# Patient Record
Sex: MALE | Race: OTHER | Employment: UNEMPLOYED | URBAN - METROPOLITAN AREA
[De-identification: names, ages, dates, MRNs, and addresses within clinical notes are randomized per-mention and may not be internally consistent; named-entity substitution may affect disease eponyms.]

---

## 2019-09-16 ENCOUNTER — APPOINTMENT (OUTPATIENT)
Dept: CT IMAGING | Age: 44
End: 2019-09-16
Attending: PHYSICIAN ASSISTANT
Payer: OTHER GOVERNMENT

## 2019-09-16 ENCOUNTER — HOSPITAL ENCOUNTER (EMERGENCY)
Age: 44
Discharge: HOME | End: 2019-09-16
Attending: EMERGENCY MEDICINE | Admitting: EMERGENCY MEDICINE
Payer: OTHER GOVERNMENT

## 2019-09-16 VITALS — SYSTOLIC BLOOD PRESSURE: 145 MMHG | HEART RATE: 82 BPM | DIASTOLIC BLOOD PRESSURE: 94 MMHG

## 2019-09-16 DIAGNOSIS — N20.1 URETERAL STONE: Primary | ICD-10-CM

## 2019-09-16 LAB
ALBUMIN SERPL-MCNC: 4.6 G/DL (ref 3.5–5)
ALBUMIN/GLOB SERPL: 1.2 {RATIO} (ref 1.1–2.2)
ALP SERPL-CCNC: 63 U/L (ref 45–117)
ALT SERPL-CCNC: 54 U/L (ref 12–78)
AMORPH CRY URNS QL MICRO: ABNORMAL
ANION GAP SERPL CALC-SCNC: 11 MMOL/L (ref 5–15)
APPEARANCE UR: ABNORMAL
AST SERPL-CCNC: 26 U/L (ref 15–37)
BACTERIA URNS QL MICRO: NEGATIVE /HPF
BASOPHILS # BLD: 0 K/UL (ref 0–0.1)
BASOPHILS NFR BLD: 0 % (ref 0–1)
BILIRUB SERPL-MCNC: 0.6 MG/DL (ref 0.2–1)
BILIRUB UR QL: NEGATIVE
BUN SERPL-MCNC: 19 MG/DL (ref 6–20)
BUN/CREAT SERPL: 17 (ref 12–20)
CALCIUM SERPL-MCNC: 10.1 MG/DL (ref 8.5–10.1)
CHLORIDE SERPL-SCNC: 106 MMOL/L (ref 97–108)
CO2 SERPL-SCNC: 24 MMOL/L (ref 21–32)
COLOR UR: ABNORMAL
COMMENT, HOLDF: NORMAL
CREAT SERPL-MCNC: 1.1 MG/DL (ref 0.7–1.3)
DIFFERENTIAL METHOD BLD: ABNORMAL
EOSINOPHIL # BLD: 0.2 K/UL (ref 0–0.4)
EOSINOPHIL NFR BLD: 2 % (ref 0–7)
EPITH CASTS URNS QL MICRO: ABNORMAL /LPF
ERYTHROCYTE [DISTWIDTH] IN BLOOD BY AUTOMATED COUNT: 12.4 % (ref 11.5–14.5)
GLOBULIN SER CALC-MCNC: 3.9 G/DL (ref 2–4)
GLUCOSE SERPL-MCNC: 120 MG/DL (ref 65–100)
GLUCOSE UR STRIP.AUTO-MCNC: NEGATIVE MG/DL
HCT VFR BLD AUTO: 46.2 % (ref 36.6–50.3)
HGB BLD-MCNC: 16.2 G/DL (ref 12.1–17)
HGB UR QL STRIP: ABNORMAL
IMM GRANULOCYTES # BLD AUTO: 0 K/UL
IMM GRANULOCYTES NFR BLD AUTO: 0 %
KETONES UR QL STRIP.AUTO: NEGATIVE MG/DL
LEUKOCYTE ESTERASE UR QL STRIP.AUTO: NEGATIVE
LIPASE SERPL-CCNC: 1053 U/L (ref 73–393)
LYMPHOCYTES # BLD: 5.3 K/UL (ref 0.8–3.5)
LYMPHOCYTES NFR BLD: 50 % (ref 12–49)
MCH RBC QN AUTO: 32 PG (ref 26–34)
MCHC RBC AUTO-ENTMCNC: 35.1 G/DL (ref 30–36.5)
MCV RBC AUTO: 91.1 FL (ref 80–99)
MONOCYTES # BLD: 0.3 K/UL (ref 0–1)
MONOCYTES NFR BLD: 3 % (ref 5–13)
NEUTS SEG # BLD: 4.7 K/UL (ref 1.8–8)
NEUTS SEG NFR BLD: 45 % (ref 32–75)
NITRITE UR QL STRIP.AUTO: NEGATIVE
NRBC # BLD: 0 K/UL (ref 0–0.01)
NRBC BLD-RTO: 0 PER 100 WBC
PH UR STRIP: 7 [PH] (ref 5–8)
PLATELET # BLD AUTO: 201 K/UL (ref 150–400)
PMV BLD AUTO: 11.3 FL (ref 8.9–12.9)
POTASSIUM SERPL-SCNC: 3.3 MMOL/L (ref 3.5–5.1)
PROT SERPL-MCNC: 8.5 G/DL (ref 6.4–8.2)
PROT UR STRIP-MCNC: 30 MG/DL
RBC # BLD AUTO: 5.07 M/UL (ref 4.1–5.7)
RBC #/AREA URNS HPF: ABNORMAL /HPF (ref 0–5)
RBC MORPH BLD: ABNORMAL
SAMPLES BEING HELD,HOLD: NORMAL
SODIUM SERPL-SCNC: 141 MMOL/L (ref 136–145)
SP GR UR REFRACTOMETRY: 1.02 (ref 1–1.03)
UR CULT HOLD, URHOLD: NORMAL
UROBILINOGEN UR QL STRIP.AUTO: 1 EU/DL (ref 0.2–1)
WBC # BLD AUTO: 10.5 K/UL (ref 4.1–11.1)
WBC MORPH BLD: ABNORMAL
WBC URNS QL MICRO: ABNORMAL /HPF (ref 0–4)

## 2019-09-16 PROCEDURE — 96361 HYDRATE IV INFUSION ADD-ON: CPT

## 2019-09-16 PROCEDURE — 96375 TX/PRO/DX INJ NEW DRUG ADDON: CPT

## 2019-09-16 PROCEDURE — 74011250636 HC RX REV CODE- 250/636: Performed by: PHYSICIAN ASSISTANT

## 2019-09-16 PROCEDURE — 80053 COMPREHEN METABOLIC PANEL: CPT

## 2019-09-16 PROCEDURE — 36415 COLL VENOUS BLD VENIPUNCTURE: CPT

## 2019-09-16 PROCEDURE — 99283 EMERGENCY DEPT VISIT LOW MDM: CPT

## 2019-09-16 PROCEDURE — 74176 CT ABD & PELVIS W/O CONTRAST: CPT

## 2019-09-16 PROCEDURE — 96374 THER/PROPH/DIAG INJ IV PUSH: CPT

## 2019-09-16 PROCEDURE — 83690 ASSAY OF LIPASE: CPT

## 2019-09-16 PROCEDURE — 81001 URINALYSIS AUTO W/SCOPE: CPT

## 2019-09-16 PROCEDURE — 85025 COMPLETE CBC W/AUTO DIFF WBC: CPT

## 2019-09-16 RX ORDER — PROCHLORPERAZINE EDISYLATE 5 MG/ML
10 INJECTION INTRAMUSCULAR; INTRAVENOUS
Status: COMPLETED | OUTPATIENT
Start: 2019-09-16 | End: 2019-09-16

## 2019-09-16 RX ORDER — DIPHENHYDRAMINE HYDROCHLORIDE 50 MG/ML
25 INJECTION, SOLUTION INTRAMUSCULAR; INTRAVENOUS
Status: COMPLETED | OUTPATIENT
Start: 2019-09-16 | End: 2019-09-16

## 2019-09-16 RX ORDER — ONDANSETRON 4 MG/1
4 TABLET, ORALLY DISINTEGRATING ORAL
Qty: 10 TAB | Refills: 0 | Status: ON HOLD | OUTPATIENT
Start: 2019-09-16 | End: 2022-09-26

## 2019-09-16 RX ORDER — NAPROXEN 500 MG/1
500 TABLET ORAL 2 TIMES DAILY WITH MEALS
Qty: 20 TAB | Refills: 0 | Status: SHIPPED | OUTPATIENT
Start: 2019-09-16 | End: 2019-09-26

## 2019-09-16 RX ORDER — ONDANSETRON 2 MG/ML
4 INJECTION INTRAMUSCULAR; INTRAVENOUS
Status: COMPLETED | OUTPATIENT
Start: 2019-09-16 | End: 2019-09-16

## 2019-09-16 RX ORDER — KETOROLAC TROMETHAMINE 30 MG/ML
15 INJECTION, SOLUTION INTRAMUSCULAR; INTRAVENOUS
Status: COMPLETED | OUTPATIENT
Start: 2019-09-16 | End: 2019-09-16

## 2019-09-16 RX ADMIN — ONDANSETRON 4 MG: 2 INJECTION INTRAMUSCULAR; INTRAVENOUS at 14:49

## 2019-09-16 RX ADMIN — DIPHENHYDRAMINE HYDROCHLORIDE 25 MG: 50 INJECTION, SOLUTION INTRAMUSCULAR; INTRAVENOUS at 15:11

## 2019-09-16 RX ADMIN — SODIUM CHLORIDE 1000 ML: 900 INJECTION, SOLUTION INTRAVENOUS at 14:49

## 2019-09-16 RX ADMIN — PROCHLORPERAZINE EDISYLATE 10 MG: 5 INJECTION INTRAMUSCULAR; INTRAVENOUS at 15:11

## 2019-09-16 RX ADMIN — KETOROLAC TROMETHAMINE 15 MG: 30 INJECTION, SOLUTION INTRAMUSCULAR at 14:49

## 2019-09-16 NOTE — ED TRIAGE NOTES
Patient arrives from home with severe right sided flank pains. Started around 4am.    History of kidney stones.   Patient is diaphoretic, in distress,

## 2019-09-16 NOTE — ED PROVIDER NOTES
72-year-old male presenting via EMS with complaint of sudden onset of right foot complaints is about 430 this morning gradually worsening with associated nausea without vomiting. He reports associated diaphoresis and chills no associated fever, cough, chest pain, shortness of breath, constipation, diarrhea. He also reports noticing blood within urine. Pain is described as worse than prior kidney stones. He reports a family history of renal failure. No medications for pain and nausea prior to arrival.           Past Medical History:   Diagnosis Date    Depression     High cholesterol     Hypertension     Kidney stones     Peptic ulcer        Past Surgical History:   Procedure Laterality Date    HX CERVICAL FUSION      C5 C4     HX LAP CHOLECYSTECTOMY  2004    HX OTHER SURGICAL  2016    Incision and drainage of intersphincteric perirectal abscess; Dr. Lou Torres.          Family History:   Problem Relation Age of Onset    Diabetes Maternal Grandmother     Stroke Maternal Grandmother     Colon Cancer Paternal Grandmother     Cancer Paternal Grandfather         prostate    Heart Disease Father     Anesth Problems Neg Hx        Social History     Socioeconomic History    Marital status:      Spouse name: Not on file    Number of children: Not on file    Years of education: Not on file    Highest education level: Not on file   Occupational History    Not on file   Social Needs    Financial resource strain: Not on file    Food insecurity:     Worry: Not on file     Inability: Not on file    Transportation needs:     Medical: Not on file     Non-medical: Not on file   Tobacco Use    Smoking status: Former Smoker     Packs/day: 0.50     Years: 10.00     Pack years: 5.00     Last attempt to quit: 3/16/2011     Years since quittin.5    Smokeless tobacco: Never Used    Tobacco comment: former cigarette smoker quit 2012   Substance and Sexual Activity    Alcohol use: No    Drug use: No    Sexual activity: Not on file   Lifestyle    Physical activity:     Days per week: Not on file     Minutes per session: Not on file    Stress: Not on file   Relationships    Social connections:     Talks on phone: Not on file     Gets together: Not on file     Attends [employer] service: Not on file     Active member of club or organization: Not on file     Attends meetings of clubs or organizations: Not on file     Relationship status: Not on file    Intimate partner violence:     Fear of current or ex partner: Not on file     Emotionally abused: Not on file     Physically abused: Not on file     Forced sexual activity: Not on file   Other Topics Concern    Not on file   Social History Narrative    Not on file         ALLERGIES: Morphine    Review of Systems   Constitutional: Positive for appetite change, chills and diaphoresis. Negative for fever. HENT: Negative for congestion, ear pain, rhinorrhea, sore throat and voice change. Eyes: Negative. Negative for photophobia, pain and itching. Respiratory: Negative for cough, chest tightness and shortness of breath. Cardiovascular: Negative for chest pain and palpitations. Gastrointestinal: Positive for nausea. Negative for abdominal distention, abdominal pain, constipation, diarrhea and vomiting. Genitourinary: Positive for flank pain. Negative for difficulty urinating, dysuria, frequency and urgency. Skin: Positive for color change. Neurological: Negative for weakness, numbness and headaches. Psychiatric/Behavioral: Negative for confusion and decreased concentration. All other systems reviewed and are negative. Vitals:    09/16/19 1440   BP: (!) 145/94   Pulse: 82            Physical Exam   Constitutional: He is oriented to person, place, and time. He appears well-developed and well-nourished. He appears distressed. Uncomfortable appearing adult male diaphoretic   HENT:   Head: Normocephalic and atraumatic. Right Ear: External ear normal.   Left Ear: External ear normal.   Nose: Nose normal.   Mouth/Throat: Oropharynx is clear and moist. No oropharyngeal exudate. Eyes: Pupils are equal, round, and reactive to light. Conjunctivae and EOM are normal. Right eye exhibits no discharge. Left eye exhibits no discharge. Neck: Normal range of motion. Neck supple. Cardiovascular: Normal rate, regular rhythm and normal heart sounds. Pulmonary/Chest: Effort normal and breath sounds normal. He has no wheezes. He has no rales. Abdominal: Soft. Bowel sounds are normal. He exhibits no distension. There is no tenderness. There is no guarding. Musculoskeletal: Normal range of motion. Lymphadenopathy:     He has no cervical adenopathy. Neurological: He is alert and oriented to person, place, and time. No cranial nerve deficit. Skin: Skin is warm. He is diaphoretic. Psychiatric: He has a normal mood and affect. His behavior is normal.   Nursing note and vitals reviewed. MDM  Number of Diagnoses or Management Options  Diagnosis management comments: 56-year-old male presenting with right-sided flank pain since about 4 AM.  History of prior obstructive uropathy. Likely same etiology as his current presentation. Will check basic labs, CT abdomen and pelvis, provide analgesia, IV fluid and antiemetic therapy. Amount and/or Complexity of Data Reviewed  Clinical lab tests: ordered and reviewed  Tests in the radiology section of CPT®: ordered and reviewed  Independent visualization of images, tracings, or specimens: yes           Procedures      Progress note  Labs and imaging pending. Patient care transferred to [place], [place]      I was personally available for consultation in the emergency department. I have reviewed the chart and agree with the documentation recorded by the [facility] AND [facility], including the assessment, treatment plan, and disposition.   Dea Puga MD

## 2019-09-16 NOTE — DISCHARGE INSTRUCTIONS
Patient Education        Kidney Stone: Care Instructions  Your Care Instructions    Kidney stones are formed when salts, minerals, and other substances normally found in the urine clump together. They can be as small as grains of sand or, rarely, as large as golf balls. While the stone is traveling through the ureter, which is the tube that carries urine from the kidney to the bladder, you will probably feel pain. The pain may be mild or very severe. You may also have some blood in your urine. As soon as the stone reaches the bladder, any intense pain should go away. If a stone is too large to pass on its own, you may need a medical procedure to help you pass the stone. The doctor has checked you carefully, but problems can develop later. If you notice any problems or new symptoms, get medical treatment right away. Follow-up care is a key part of your treatment and safety. Be sure to make and go to all appointments, and call your doctor if you are having problems. It's also a good idea to know your test results and keep a list of the medicines you take. How can you care for yourself at home? · Drink plenty of fluids, enough so that your urine is light yellow or clear like water. If you have kidney, heart, or liver disease and have to limit fluids, talk with your doctor before you increase the amount of fluids you drink. · Take pain medicines exactly as directed. Call your doctor if you think you are having a problem with your medicine. ? If the doctor gave you a prescription medicine for pain, take it as prescribed. ? If you are not taking a prescription pain medicine, ask your doctor if you can take an over-the-counter medicine. Read and follow all instructions on the label. · Your doctor may ask you to strain your urine so that you can collect your kidney stone when it passes. You can use a kitchen strainer or a tea strainer to catch the stone.  Store it in a plastic bag until you see your doctor again.  Preventing future kidney stones  Some changes in your diet may help prevent kidney stones. Depending on the cause of your stones, your doctor may recommend that you:  · Drink plenty of fluids, enough so that your urine is light yellow or clear like water. If you have kidney, heart, or liver disease and have to limit fluids, talk with your doctor before you increase the amount of fluids you drink. · Limit coffee, tea, and alcohol. Also avoid grapefruit juice. · Do not take more than the recommended daily dose of vitamins C and D.  · Avoid antacids such as Gaviscon, Maalox, Mylanta, or Tums. · Limit the amount of salt (sodium) in your diet. · Eat a balanced diet that is not too high in protein. · Limit foods that are high in a substance called oxalate, which can cause kidney stones. These foods include dark green vegetables, rhubarb, chocolate, wheat bran, nuts, cranberries, and beans. When should you call for help? Call your doctor now or seek immediate medical care if:    · You cannot keep down fluids.     · Your pain gets worse.     · You have a fever or chills.     · You have new or worse pain in your back just below your rib cage (the flank area).     · You have new or more blood in your urine.    Watch closely for changes in your health, and be sure to contact your doctor if:    · You do not get better as expected. Where can you learn more? Go to http://caroline-daria.info/. Enter K593 in the search box to learn more about \"Kidney Stone: Care Instructions. \"  Current as of: October 31, 2018  Content Version: 12.1  © 7738-4205 [employer]. Care instructions adapted under license by [employer] (which disclaims liability or warranty for this information).  If you have questions about a medical condition or this instruction, always ask your healthcare professional. [address] any warranty or liability for your use of this information. We hope that we have addressed all of your medical concerns. The examination and treatment you received in the Emergency Department were for an emergent problem and were not intended as complete care. It is important that you follow up with your healthcare provider(s) for ongoing care. If your symptoms worsen or do not improve as expected, and you are unable to reach your usual health care provider(s), you should return to the Emergency Department. Today's healthcare is undergoing tremendous change, and patient satisfaction surveys are one of the many tools to assess the quality of medical care. You may receive a survey from the [employer] regarding your experience in the Emergency Department. I hope that your experience has been completely positive, particularly the medical care that I provided. As such, please participate in the survey; anything less than excellent does not meet my expectations or intentions. [facility] and [address] [place] [address] participate in nationally recognized quality of care measures. If your blood pressure is greater than 120/80, as reported below, we urge that you seek medical care to address the potential of high blood pressure, commonly known as hypertension. Hypertension can be hereditary or can be caused by certain medical conditions, pain, stress, or \"white coat syndrome. \"       Please make an appointment with your health care provider(s) for follow up of your Emergency Department visit. VITALS:   Patient Vitals for the past 8 hrs:   Pulse BP   09/16/19 1440 82 (!) 145/94          Thank you for allowing us to provide you with medical care today. We realize that you have many choices for your emergency care needs. Please choose us in the future for any continued health care needs. Edwige Arias, [address] [place] [address] [address]   Office: [phone]            Recent Results (from the past 24 hour(s))   CBC WITH AUTOMATED DIFF    Collection Time: 09/16/19  2:46 PM   Result Value Ref Range    WBC 10.5 4.1 - 11.1 K/uL    RBC 5.07 4. 10 - 5.70 M/uL    HGB 16.2 12.1 - 17.0 g/dL    HCT 46.2 36.6 - 50.3 %    MCV 91.1 80.0 - 99.0 FL    MCH 32.0 26.0 - 34.0 PG    MCHC 35.1 30.0 - 36.5 g/dL    RDW 12.4 11.5 - 14.5 %    PLATELET 951 423 - 763 K/uL    MPV 11.3 8.9 - 12.9 FL    NRBC 0.0 0  WBC    ABSOLUTE NRBC 0.00 0.00 - 0.01 K/uL    NEUTROPHILS 45 32 - 75 %    LYMPHOCYTES 50 (H) 12 - 49 %    MONOCYTES 3 (L) 5 - 13 %    EOSINOPHILS 2 0 - 7 %    BASOPHILS 0 0 - 1 %    IMMATURE GRANULOCYTES 0 %    ABS. NEUTROPHILS 4.7 1.8 - 8.0 K/UL    ABS. LYMPHOCYTES 5.3 (H) 0.8 - 3.5 K/UL    ABS. MONOCYTES 0.3 0.0 - 1.0 K/UL    ABS. EOSINOPHILS 0.2 0.0 - 0.4 K/UL    ABS. BASOPHILS 0.0 0.0 - 0.1 K/UL    ABS. IMM. GRANS. 0.0 K/UL    DF MANUAL      RBC COMMENTS NORMOCYTIC, NORMOCHROMIC      WBC COMMENTS ATYPICAL LYMPHOCYTES PRESENT     METABOLIC PANEL, COMPREHENSIVE    Collection Time: 09/16/19  2:46 PM   Result Value Ref Range    Sodium 141 136 - 145 mmol/L    Potassium 3.3 (L) 3.5 - 5.1 mmol/L    Chloride 106 97 - 108 mmol/L    CO2 24 21 - 32 mmol/L    Anion gap 11 5 - 15 mmol/L    Glucose 120 (H) 65 - 100 mg/dL    BUN 19 6 - 20 MG/DL    Creatinine 1.10 0.70 - 1.30 MG/DL    BUN/Creatinine ratio 17 12 - 20      GFR est AA >60 >60 ml/min/1.73m2    GFR est non-AA >60 >60 ml/min/1.73m2    Calcium 10.1 8.5 - 10.1 MG/DL    Bilirubin, total 0.6 0.2 - 1.0 MG/DL    ALT (SGPT) 54 12 - 78 U/L    AST (SGOT) 26 15 - 37 U/L    Alk.  phosphatase 63 45 - 117 U/L    Protein, total 8.5 (H) 6.4 - 8.2 g/dL    Albumin 4.6 3.5 - 5.0 g/dL    Globulin 3.9 2.0 - 4.0 g/dL    A-G Ratio 1.2 1.1 - 2.2     SAMPLES BEING HELD    Collection Time: 09/16/19  2:46 PM   Result Value Ref Range    SAMPLES BEING HELD 1RED 1BLU     COMMENT        Add-on orders for these samples will be processed based on acceptable specimen integrity and analyte stability, which may vary by analyte. LIPASE    Collection Time: 09/16/19  2:46 PM   Result Value Ref Range    Lipase 1,053 (H) 73 - 393 U/L   URINALYSIS W/MICROSCOPIC    Collection Time: 09/16/19  3:11 PM   Result Value Ref Range    Color YELLOW/STRAW      Appearance TURBID (A) CLEAR      Specific gravity 1.020 1.003 - 1.030      pH (UA) 7.0 5.0 - 8.0      Protein 30 (A) NEG mg/dL    Glucose NEGATIVE  NEG mg/dL    Ketone NEGATIVE  NEG mg/dL    Bilirubin NEGATIVE  NEG      Blood MODERATE (A) NEG      Urobilinogen 1.0 0.2 - 1.0 EU/dL    Nitrites NEGATIVE  NEG      Leukocyte Esterase NEGATIVE  NEG      WBC 0-4 0 - 4 /hpf    RBC 5-10 0 - 5 /hpf    Epithelial cells FEW FEW /lpf    Bacteria NEGATIVE  NEG /hpf    Amorphous Crystals 2+ (A) NEG   URINE CULTURE HOLD SAMPLE    Collection Time: 09/16/19  3:11 PM   Result Value Ref Range    Urine culture hold        URINE ON HOLD IN MICROBIOLOGY DEPT FOR 3 DAYS. IF UNPRESERVED URINE IS SUBMITTED, IT CANNOT BE USED FOR ADDITIONAL TESTING AFTER 24 HRS, RECOLLECTION WILL BE REQUIRED. Ct Abd Pelv Wo Cont    Result Date: 9/16/2019  EXAM: CT ABDOMEN AND PELVIS WITHOUT CONTRAST INDICATION: Flank pain, nausea and vomiting. COMPARISON: 6/13/2013. CONTRAST: None. TECHNIQUE: Unenhanced multislice helical CT was performed from the diaphragm to the symphysis pubis without intravenous contrast administration. Oral contrast was not administered. Contiguous 5 mm axial images were reconstructed and lung and soft tissue windows were generated. Coronal and sagittal reformations were generated. CT dose reduction was achieved through use of a standardized protocol tailored for this examination and automatic exposure control for dose modulation. Adaptive statistical iterative reconstruction (ASIR) was utilized for this examination. FINDINGS: LOWER CHEST: The visualized portions of the lung bases are clear.  The absence of intravenous contrast material reduces the sensitivity for evaluation of the solid parenchymal organs of the abdomen. ABDOMEN: Liver: The liver is normal in size and contour with no focal abnormality. Gallbladder and bile ducts: There are clips in the gallbladder fossa and the gallbladder is absent. Spleen: No abnormality. Pancreas: No abnormality. Adrenal glands: No abnormality. Kidneys: No focal parenchymal abnormality. There is hydronephrosis and hydroureter on the right down to the level of a 3 mm stone at the right ureterovesical junction. There are no renal calculi. PELVIS: Reproductive organs: The prostate gland and seminal vesicles are symmetrical. There are a few prosthetic calcifications and pelvic phleboliths. Bladder: No abnormality. RETROPERITONEUM: The aorta tapers without aneurysm. There is no retroperitoneal adenopathy or mass. There is no pelvic mass or adenopathy. BOWEL AND MESENTERY: The small bowel is normal. The appendix is normal. PERITONEUM: There is no ascites or free intraperitoneal air. BONES AND SOFT TISSUES: The bones and soft tissues of the abdominal wall are within normal limits. IMPRESSION: 1. 3 mm right ureterovesical junction calculus with obstruction. 2. Status post cholecystectomy.

## 2020-09-11 ENCOUNTER — OFFICE VISIT (OUTPATIENT)
Dept: NEUROLOGY | Facility: CLINIC | Age: 45
End: 2020-09-11
Payer: OTHER GOVERNMENT

## 2020-09-11 VITALS
HEIGHT: 69 IN | SYSTOLIC BLOOD PRESSURE: 140 MMHG | RESPIRATION RATE: 16 BRPM | WEIGHT: 220 LBS | BODY MASS INDEX: 32.58 KG/M2 | OXYGEN SATURATION: 100 % | DIASTOLIC BLOOD PRESSURE: 90 MMHG | HEART RATE: 99 BPM

## 2020-09-11 DIAGNOSIS — M54.12 CERVICAL RADICULOPATHY: ICD-10-CM

## 2020-09-11 DIAGNOSIS — S06.9X0A TRAUMATIC BRAIN INJURY, WITHOUT LOSS OF CONSCIOUSNESS, INITIAL ENCOUNTER (HCC): Primary | ICD-10-CM

## 2020-09-11 PROCEDURE — 99204 OFFICE O/P NEW MOD 45 MIN: CPT | Performed by: PSYCHIATRY & NEUROLOGY
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
## 2020-09-11 NOTE — PROGRESS NOTES
patient states he is here for anger issues since 2004 he had cervical fusion. he has been seeing therapists and phych even treated with medication still nothing helps. no other concerns today    . Chief Complaint   Patient presents with    Follow-up     patient states he is here for anger issues since 2004 he had cervical fusion. he has been seeing therapists and phych even treated with medication still nothing helps. no other concerns today     .   Visit Vitals  /90 (BP 1 Location: Left arm, BP Patient Position: Sitting)   Pulse 99   Resp 16   Ht 5' 9\" (1.753 m)   Wt 220 lb (99.8 kg)   SpO2 100%   BMI 32.49 kg/m²

## 2020-09-15 ENCOUNTER — OFFICE VISIT (OUTPATIENT)
Dept: NEUROLOGY | Facility: CLINIC | Age: 45
End: 2020-09-15
Payer: OTHER GOVERNMENT

## 2020-09-15 VITALS
HEART RATE: 72 BPM | HEIGHT: 69 IN | OXYGEN SATURATION: 98 % | SYSTOLIC BLOOD PRESSURE: 135 MMHG | WEIGHT: 220 LBS | DIASTOLIC BLOOD PRESSURE: 80 MMHG | BODY MASS INDEX: 32.58 KG/M2 | RESPIRATION RATE: 16 BRPM

## 2020-09-15 DIAGNOSIS — M54.12 CERVICAL RADICULOPATHY AT C6: Primary | ICD-10-CM

## 2020-09-15 PROCEDURE — 95886 MUSC TEST DONE W/N TEST COMP: CPT | Performed by: PSYCHIATRY & NEUROLOGY

## 2020-09-15 PROCEDURE — 95909 NRV CNDJ TST 5-6 STUDIES: CPT | Performed by: PSYCHIATRY & NEUROLOGY

## 2020-09-15 NOTE — PROGRESS NOTES
[address] [facility]  [address]  Phone [phone] Fax [phone]  Test Date:  9/15/2020    Patient: Codie Palomo : 1975 Physician: Sneha Watt MD   Sex: Male Height: 5' 9\" Ref Phys: Sneha Watt. MD   ID#: [id number] Weight: 220 lbs. Technician: Mouna Jackson. .EMG TECH     Patient Complaints:    Chronic radicular pain in setting of prior C4-C6 fusion for C5 burst fracture     Patient History / Exam:    Neris Marrufo 46-year-old right-hand-dominant male with history of prior C5 burst fracture C4-C6 fusion who presents for evaluation of ongoing radicular symptoms. Examination is stable from recent visit for initial evaluation at [facility] Neurology clinic    NCV & EMG Findings:  Sensory nerve conduction studies (NCSs) of the right median, ulnar and radial nerves demonstrated normal peak latencies and amplitudes. Sensory NCS of the left median nerve demonstrated normal peak latency and amplitude. Motor NCSs of the right median and ulnar nerve demonstrated normal onset latencies, amplitudes and conduction velocities. Motor NCS of the left median nerve demonstrated normal onset latency, amplitude and conduction velocity. Right ulnar F-wave latency was normal.    Median to ulnar sensory transpulmonary comparison NCS demonstrated borderline to mild latency prolongation bilaterally. Electromyography (EMG) of the right FDI and ABP were unremarkable. EMG of right biceps, triceps and deltoid demonstrated chronic reinnervation changes in the form of large amplitude, predominantly polyphasic motor units prolonged in duration with reduced recruitment. EMG of the mid cervical paraspinal did not demonstrate ongoing denervation      Impression: This is an abnormal nerve conduction/EMG consistent with a chronic C5-C7 (predominantly C6) right cervical polyradiculopathy. Findings on sensory comparison studies are considered equivocal and not clearly significant given symptoms, lack of additional median sensorimotor abnormalities.     ___________________________  Elsi Sibley MD        Nerve Conduction Studies  Anti Sensory Summary Table     Stim Site NR Peak (ms) Norm Peak (ms) P-T Amp (µV) Norm P-T Amp Onset (ms) Site1 Site2 Delta-P (ms) Dist (cm) Yuan (m/s) Norm Yuan (m/s)   Left Median Anti Sensory (2nd Digit)  32.9°C   Wrist    3.1 <3.6 14.0 >10 2.3 Wrist 2nd Digit 3.1 14.0 45 >39   Right Median Anti Sensory (2nd Digit)  31.7°C   Wrist    3.4 <3.6 34.4 >10 2.8 Wrist 2nd Digit 3.4 14.0 41 >39   Right Radial Anti Sensory (Base 1st Digit)  32.4°C   Wrist    2.3 <3.1 29.6  1.7 Wrist Base 1st Digit 2.3 10.0 43    Right Ulnar Anti Sensory (5th Digit)  32°C   Wrist    3.4 <3.7 38.4 >15.0 2.2 Wrist 5th Digit 3.4 14.0 41 >38     Motor Summary Table     Stim Site NR Onset (ms) Norm Onset (ms) O-P Amp (mV) Norm O-P Amp Site1 Site2 Delta-0 (ms) Dist (cm) Yuan (m/s) Norm Yuan (m/s)   Left Median Motor (Abd Poll Brev)  32.9°C   Wrist    3.9 <4.2 5.7 >5 Elbow Wrist 4.0 20.0 50 >50   Elbow    7.9  5.1          Right Median Motor (Abd Poll Brev)  31.2°C   Wrist    4.2 <4.2 5.3 >5 Elbow Wrist 3.8 21.0 55 >50   Elbow    8.0  4.6          Right Ulnar Motor (Abd Dig Minimi)  33.1°C   Wrist    3.0 <4.2 10.5 >3 B Elbow Wrist 3.7 20.0 54 >53   B Elbow    6.7  9.0  A Elbow B Elbow 1.9 10.0 53 >53   A Elbow    8.6  8.8            Comparison Summary Table     Stim Site NR Peak (ms) Norm Peak (ms) P-T Amp (µV) Site1 Site2 Delta-P (ms) Norm Delta (ms)   Left Median/Ulnar Palm Comparison (Wrist - 8cm)  33°C   Median Palm    2.0 <2.5 49.3 Median Palm Ulnar Palm 0.5 <0.3   Ulnar Palm    1.5 <2.5 18.7       Right Median/Ulnar Palm Comparison (Wrist - 8cm)  32.2°C   Median Palm    1.9 <2.5 13.2 Median Palm Ulnar Palm 0.6 <0.3   Ulnar Palm    1.3 <2.5 13.5         F Wave Studies     NR F-Lat (ms) Lat Norm (ms) L-R F-Lat (ms) L-R Lat Norm   Right Ulnar (Mrkrs) (Abd Dig Min)  33.3°C      30.11 <36  <2.5     EMG     Side Muscle Nerve Root Ins Act Fibs Psw Amp Dur Poly Recrt Int Juani Ralphs Comment   Right Abd Poll Brev Median C8-T1 Nml Nml Nml Nml Nml 0 Nml Nml    Right 1stDorInt Ulnar C8-T1 Nml Nml Nml Nml Nml 0 Nml Nml    Right Biceps Musculocut C5-6 Nml Nml Nml Incr >12ms 1+ Reduced Nml    Right Triceps Radial C6-7-8 Nml Nml Nml Incr >12ms 1+ Reduced Nml    Right Deltoid Axillary C5-6 Nml Nml Nml Incr > 12ms 0 Reduced  Nml    Right Cervical Parasp Mid Rami C4-6 Nml Nml Nml               Waveforms:

## 2020-09-24 ENCOUNTER — HOSPITAL ENCOUNTER (OUTPATIENT)
Dept: MRI IMAGING | Age: 45
Discharge: HOME | End: 2020-09-24
Attending: PSYCHIATRY & NEUROLOGY
Payer: OTHER GOVERNMENT

## 2020-09-24 DIAGNOSIS — M54.12 CERVICAL RADICULOPATHY: ICD-10-CM

## 2020-09-24 DIAGNOSIS — S06.9X0A TRAUMATIC BRAIN INJURY, WITHOUT LOSS OF CONSCIOUSNESS, INITIAL ENCOUNTER (HCC): ICD-10-CM

## 2020-09-24 PROCEDURE — 72141 MRI NECK SPINE W/O DYE: CPT

## 2020-09-24 PROCEDURE — 70551 MRI BRAIN STEM W/O DYE: CPT

## 2020-09-25 ENCOUNTER — TELEPHONE (OUTPATIENT)
Dept: NEUROLOGY | Facility: CLINIC | Age: 45
End: 2020-09-25

## 2020-10-15 ENCOUNTER — OFFICE VISIT (OUTPATIENT)
Dept: NEUROLOGY | Facility: CLINIC | Age: 45
End: 2020-10-15
Payer: OTHER GOVERNMENT

## 2020-10-15 VITALS
OXYGEN SATURATION: 94 % | HEART RATE: 84 BPM | BODY MASS INDEX: 32.58 KG/M2 | SYSTOLIC BLOOD PRESSURE: 102 MMHG | DIASTOLIC BLOOD PRESSURE: 60 MMHG | HEIGHT: 69 IN | RESPIRATION RATE: 16 BRPM | WEIGHT: 220 LBS

## 2020-10-15 DIAGNOSIS — M54.12 CERVICAL RADICULOPATHY AT C6: Primary | ICD-10-CM

## 2020-10-15 PROCEDURE — 99213 OFFICE O/P EST LOW 20 MIN: CPT | Performed by: PSYCHIATRY & NEUROLOGY

## 2020-10-15 NOTE — PATIENT INSTRUCTIONS
PRESCRIPTION REFILL POLICY [facility] Statement to Patients April 1, 2014 In an effort to ensure the large volume of patient prescription refills is processed in the most efficient and expeditious manner, we are asking our patients to assist us by calling your Pharmacy for all prescription refills, this will include also your  Mail Order Pharmacy. The pharmacy will contact our office electronically to continue the refill process. Please do not wait until the last minute to call your pharmacy. We need at least 48 hours (2days) to fill prescriptions. We also encourage you to call your pharmacy before going to  your prescription to make sure it is ready. With regard to controlled substance prescription refill requests (narcotic refills) that need to be picked up at our office, we ask your cooperation by providing us with at least 72 hours (3days) notice that you will need a refill. We will not refill narcotic prescription refill requests after 4:00pm on any weekday, Monday through Thursday, or after 2:00pm on Fridays, or on the weekends. We encourage everyone to explore another way of getting your prescription refill request processed using [employer], our patient web portal through our electronic medical record system. [employer] is an efficient and effective way to communicate your medication request directly to the office and  downloadable as an adam on your smart phone . [employer] also features a review functionality that allows you to view your medication list as well as leave messages for your physician. Are you ready to get connected? If so please review the attatched instructions or speak to any of our staff to get you set up right away! Thank you so much for your cooperation. Should you have any questions please contact our Practice Administrator. The Physicians and Staff,  [facility]

## 2020-10-15 NOTE — PROGRESS NOTES
. Elizabeth Zapata presents today to follow up TBI. He denies any current neurological complaints. Depression screening done on this patient.

## 2021-06-03 ENCOUNTER — TRANSCRIBE ORDER (OUTPATIENT)
Dept: SCHEDULING | Age: 46
End: 2021-06-03

## 2021-06-03 DIAGNOSIS — M54.50 LOW BACK PAIN: Primary | ICD-10-CM

## 2021-08-20 ENCOUNTER — HOSPITAL ENCOUNTER (OUTPATIENT)
Dept: INTERVENTIONAL RADIOLOGY/VASCULAR | Age: 46
Discharge: HOME | End: 2021-08-20
Attending: STUDENT IN AN ORGANIZED HEALTH CARE EDUCATION/TRAINING PROGRAM | Admitting: STUDENT IN AN ORGANIZED HEALTH CARE EDUCATION/TRAINING PROGRAM
Payer: OTHER GOVERNMENT

## 2021-08-20 VITALS
SYSTOLIC BLOOD PRESSURE: 138 MMHG | RESPIRATION RATE: 15 BRPM | WEIGHT: 215 LBS | HEIGHT: 69 IN | OXYGEN SATURATION: 98 % | HEART RATE: 72 BPM | BODY MASS INDEX: 31.84 KG/M2 | DIASTOLIC BLOOD PRESSURE: 85 MMHG | TEMPERATURE: 98.5 F

## 2021-08-20 DIAGNOSIS — M54.50 LOW BACK PAIN: ICD-10-CM

## 2021-08-20 PROCEDURE — 74011250636 HC RX REV CODE- 250/636: Performed by: STUDENT IN AN ORGANIZED HEALTH CARE EDUCATION/TRAINING PROGRAM

## 2021-08-20 PROCEDURE — 74011000636 HC RX REV CODE- 636: Performed by: STUDENT IN AN ORGANIZED HEALTH CARE EDUCATION/TRAINING PROGRAM

## 2021-08-20 PROCEDURE — 64483 NJX AA&/STRD TFRM EPI L/S 1: CPT

## 2021-08-20 PROCEDURE — 74011000250 HC RX REV CODE- 250: Performed by: STUDENT IN AN ORGANIZED HEALTH CARE EDUCATION/TRAINING PROGRAM

## 2021-08-20 RX ORDER — DEXAMETHASONE SODIUM PHOSPHATE 10 MG/ML
10 INJECTION INTRAMUSCULAR; INTRAVENOUS
Status: COMPLETED | OUTPATIENT
Start: 2021-08-20 | End: 2021-08-20

## 2021-08-20 RX ORDER — LIDOCAINE HYDROCHLORIDE 10 MG/ML
10 INJECTION, SOLUTION EPIDURAL; INFILTRATION; INTRACAUDAL; PERINEURAL
Status: COMPLETED | OUTPATIENT
Start: 2021-08-20 | End: 2021-08-20

## 2021-08-20 RX ADMIN — DEXAMETHASONE SODIUM PHOSPHATE 10 MG: 10 INJECTION, SOLUTION INTRAMUSCULAR; INTRAVENOUS at 08:02

## 2021-08-20 RX ADMIN — LIDOCAINE HYDROCHLORIDE 10 ML: 10 INJECTION, SOLUTION EPIDURAL; INFILTRATION; INTRACAUDAL; PERINEURAL at 08:02

## 2021-08-20 RX ADMIN — IOHEXOL 2 ML: 180 INJECTION INTRAVENOUS at 08:02

## 2021-08-20 NOTE — H&P
Radiology History and Physical    Patient: Shaunna Esquivel 40 y.o. male       Chief Complaint: Back pain with RLE radiculopathy    History of Present Illness: 40year old male with back pain and occasional shooting pains in the posterior right leg extending to the toes    History:    Past Medical History:   Diagnosis Date    Depression     High cholesterol     Hypertension     Kidney stones     Peptic ulcer 2011     Family History   Problem Relation Age of Onset    Diabetes Maternal Grandmother     Stroke Maternal Grandmother     Colon Cancer Paternal Grandmother     Cancer Paternal Grandfather         prostate    Heart Disease Father     Anesth Problems Neg Hx      Social History     Socioeconomic History    Marital status:      Spouse name: Not on file    Number of children: Not on file    Years of education: Not on file    Highest education level: Not on file   Occupational History    Not on file   Tobacco Use    Smoking status: Former Smoker     Packs/day: 0.50     Years: 10.00     Pack years: 5.00     Quit date: 3/16/2011     Years since quitting: 10.4    Smokeless tobacco: Never Used    Tobacco comment: former cigarette smoker quit 5/2012   Substance and Sexual Activity    Alcohol use: No    Drug use: No    Sexual activity: Not on file   Other Topics Concern    Not on file   Social History Narrative    Not on file     Social Determinants of Health     Financial Resource Strain:     Difficulty of Paying Living Expenses:    Food Insecurity:     Worried About Running Out of Food in the Last Year:     Moisés of Food in the Last Year:    Transportation Needs:     Lack of Transportation (Medical):      Lack of Transportation (Non-Medical):    Physical Activity:     Days of Exercise per Week:     Minutes of Exercise per Session:    Stress:     Feeling of Stress :    Social Connections:     Frequency of Communication with Friends and Family:     Frequency of Social Gatherings with Friends and Family:     Attends Anabaptism Services:     Active Member of Clubs or Organizations:     Attends Club or Organization Meetings:     Marital Status:    Intimate Partner Violence:     Fear of Current or Ex-Partner:     Emotionally Abused:     Physically Abused:     Sexually Abused: Allergies: Allergies   Allergen Reactions    Morphine Angioedema     PATIENT STATES HEART RATE DROPPED       Current Medications:  Current Facility-Administered Medications   Medication Dose Route Frequency    lidocaine (PF) (XYLOCAINE) 10 mg/mL (1 %) injection 10 mL  10 mL SubCUTAneous RAD ONCE    dexamethasone (PF) (DECADRON) 10 mg/mL injection 10 mg  10 mg IntraVENous RAD ONCE    iohexoL (OMNIPAQUE) 180 mg iodine/mL injection 10 mL  10 mL Intrathecal RAD ONCE        Physical Exam:  There were no vitals taken for this visit. GENERAL: alert, cooperative, no distress, appears stated age,   LUNG: Nonlabored respiration on room air  HEART: regular rate and rhythm    Alerts:    Hospital Problems  Date Reviewed: 8/16/2016    None          Laboratory:    No results for input(s): HGB, HCT, WBC, PLT, INR, BUN, CREA, K, CRCLT, HGBEXT, HCTEXT, PLTEXT, INREXT in the last 72 hours. No lab exists for component: PTT, PT      Plan of Care/Planned Procedure:  Risks, benefits, and alternatives reviewed with patient and he agrees to proceed with the procedure.      Right L5-S1 transforaminal ABBE    Vasile Hare MD  Interventional Radiology  [facility]  7:45 AM, 8/20/2021

## 2021-08-20 NOTE — PROCEDURES
Interventional Radiology  Procedure Note        8/20/2021 8:52 AM    Patient: Luis Angel Esquivel     Informed consent obtained    Diagnosis: Back pain with RLE radiculopathy    Procedure(s): Right L5-S1 transforaminal ABBE    Specimens removed:  none    Complications: None    Primary Physician: Vanda Burris MD    Recommendations: N/A    Discharge Disposition: Stable; discharge to home    Full dictated report to follow    Vanda Burris MD  Interventional Radiology  [facility]  8:52 AM, 8/20/2021

## 2021-08-20 NOTE — PROGRESS NOTES
Dr Darrius Neil at bedside providing extensive education and information regarding procedure, placement of medications, locations addressing pt questions. Pt verbalizes understanding and consent was obtained.

## 2022-09-26 ENCOUNTER — ANESTHESIA (OUTPATIENT)
Dept: ENDOSCOPY | Age: 47
End: 2022-09-26
Payer: OTHER GOVERNMENT

## 2022-09-26 ENCOUNTER — HOSPITAL ENCOUNTER (OUTPATIENT)
Age: 47
Setting detail: OUTPATIENT SURGERY
Discharge: HOME | End: 2022-09-26
Attending: INTERNAL MEDICINE | Admitting: INTERNAL MEDICINE
Payer: OTHER GOVERNMENT

## 2022-09-26 ENCOUNTER — ANESTHESIA EVENT (OUTPATIENT)
Dept: ENDOSCOPY | Age: 47
End: 2022-09-26
Payer: OTHER GOVERNMENT

## 2022-09-26 VITALS
OXYGEN SATURATION: 98 % | HEART RATE: 57 BPM | RESPIRATION RATE: 7 BRPM | TEMPERATURE: 97.8 F | SYSTOLIC BLOOD PRESSURE: 130 MMHG | BODY MASS INDEX: 31.7 KG/M2 | WEIGHT: 214 LBS | HEIGHT: 69 IN | DIASTOLIC BLOOD PRESSURE: 93 MMHG

## 2022-09-26 PROCEDURE — 2709999900 HC NON-CHARGEABLE SUPPLY: Performed by: INTERNAL MEDICINE

## 2022-09-26 PROCEDURE — 77030013992 HC SNR POLYP ENDOSC BSC -B: Performed by: INTERNAL MEDICINE

## 2022-09-26 PROCEDURE — 74011250637 HC RX REV CODE- 250/637: Performed by: INTERNAL MEDICINE

## 2022-09-26 PROCEDURE — 77030021593 HC FCPS BIOP ENDOSC BSC -A: Performed by: INTERNAL MEDICINE

## 2022-09-26 PROCEDURE — 88305 TISSUE EXAM BY PATHOLOGIST: CPT

## 2022-09-26 PROCEDURE — 74011250636 HC RX REV CODE- 250/636: Performed by: NURSE ANESTHETIST, CERTIFIED REGISTERED

## 2022-09-26 PROCEDURE — 76040000019: Performed by: INTERNAL MEDICINE

## 2022-09-26 PROCEDURE — 76060000031 HC ANESTHESIA FIRST 0.5 HR: Performed by: INTERNAL MEDICINE

## 2022-09-26 PROCEDURE — 74011250636 HC RX REV CODE- 250/636: Performed by: INTERNAL MEDICINE

## 2022-09-26 PROCEDURE — 74011000250 HC RX REV CODE- 250: Performed by: NURSE ANESTHETIST, CERTIFIED REGISTERED

## 2022-09-26 RX ORDER — SODIUM CHLORIDE 9 MG/ML
50 INJECTION, SOLUTION INTRAVENOUS CONTINUOUS
Status: DISCONTINUED | OUTPATIENT
Start: 2022-09-26 | End: 2022-09-26 | Stop reason: HOSPADM

## 2022-09-26 RX ORDER — SODIUM CHLORIDE 0.9 % (FLUSH) 0.9 %
5-40 SYRINGE (ML) INJECTION AS NEEDED
Status: DISCONTINUED | OUTPATIENT
Start: 2022-09-26 | End: 2022-09-26 | Stop reason: HOSPADM

## 2022-09-26 RX ORDER — SODIUM CHLORIDE 0.9 % (FLUSH) 0.9 %
5-40 SYRINGE (ML) INJECTION EVERY 8 HOURS
Status: DISCONTINUED | OUTPATIENT
Start: 2022-09-26 | End: 2022-09-26 | Stop reason: HOSPADM

## 2022-09-26 RX ORDER — EPINEPHRINE 0.1 MG/ML
1 INJECTION INTRACARDIAC; INTRAVENOUS
Status: DISCONTINUED | OUTPATIENT
Start: 2022-09-26 | End: 2022-09-26 | Stop reason: HOSPADM

## 2022-09-26 RX ORDER — DEXTROMETHORPHAN/PSEUDOEPHED 2.5-7.5/.8
1.2 DROPS ORAL
Status: DISCONTINUED | OUTPATIENT
Start: 2022-09-26 | End: 2022-09-26 | Stop reason: HOSPADM

## 2022-09-26 RX ORDER — PROPOFOL 10 MG/ML
INJECTION, EMULSION INTRAVENOUS AS NEEDED
Status: DISCONTINUED | OUTPATIENT
Start: 2022-09-26 | End: 2022-09-26 | Stop reason: HOSPADM

## 2022-09-26 RX ORDER — FLUMAZENIL 0.1 MG/ML
0.2 INJECTION INTRAVENOUS
Status: DISCONTINUED | OUTPATIENT
Start: 2022-09-26 | End: 2022-09-26 | Stop reason: HOSPADM

## 2022-09-26 RX ORDER — ATROPINE SULFATE 0.1 MG/ML
0.5 INJECTION INTRAVENOUS
Status: DISCONTINUED | OUTPATIENT
Start: 2022-09-26 | End: 2022-09-26 | Stop reason: HOSPADM

## 2022-09-26 RX ORDER — NALOXONE HYDROCHLORIDE 0.4 MG/ML
0.4 INJECTION, SOLUTION INTRAMUSCULAR; INTRAVENOUS; SUBCUTANEOUS
Status: DISCONTINUED | OUTPATIENT
Start: 2022-09-26 | End: 2022-09-26 | Stop reason: HOSPADM

## 2022-09-26 RX ORDER — SODIUM CHLORIDE 9 MG/ML
INJECTION, SOLUTION INTRAVENOUS
Status: DISCONTINUED | OUTPATIENT
Start: 2022-09-26 | End: 2022-09-26 | Stop reason: HOSPADM

## 2022-09-26 RX ORDER — LIDOCAINE HYDROCHLORIDE 20 MG/ML
INJECTION, SOLUTION EPIDURAL; INFILTRATION; INTRACAUDAL; PERINEURAL AS NEEDED
Status: DISCONTINUED | OUTPATIENT
Start: 2022-09-26 | End: 2022-09-26 | Stop reason: HOSPADM

## 2022-09-26 RX ADMIN — PROPOFOL 200 MG: 10 INJECTION, EMULSION INTRAVENOUS at 08:51

## 2022-09-26 RX ADMIN — LIDOCAINE HYDROCHLORIDE 50 MG: 20 INJECTION, SOLUTION EPIDURAL; INFILTRATION; INTRACAUDAL; PERINEURAL at 08:51

## 2022-09-26 RX ADMIN — SODIUM CHLORIDE 50 ML/HR: 9 INJECTION, SOLUTION INTRAVENOUS at 09:31

## 2022-09-26 RX ADMIN — SODIUM CHLORIDE: 900 INJECTION, SOLUTION INTRAVENOUS at 08:45

## 2022-09-26 RX ADMIN — PROPOFOL 100 MG: 10 INJECTION, EMULSION INTRAVENOUS at 09:00

## 2022-09-26 RX ADMIN — PROPOFOL 100 MG: 10 INJECTION, EMULSION INTRAVENOUS at 09:07

## 2022-09-26 RX ADMIN — PROPOFOL 100 MG: 10 INJECTION, EMULSION INTRAVENOUS at 09:04

## 2022-09-26 NOTE — PROCEDURES
[id number] [facility] [place] HANSON. Blue Gomes MD  [phone]      September 26, 2022    Shimon Bennett  YOB: 1975    COLONOSCOPY DISCHARGE INSTRUCTIONS    If there is redness at IV site you should apply warm compress to area. If redness or soreness persist contact Dr. Blue Gomes or your primary care doctor. There may be a slight amount of blood passed from the rectum. Gaseous discomfort may develop, but walking, belching will help relieve this. You may not operate a vehicle for 12 hours  You may not operate machinery or dangerous appliances for rest of today  You may not drink alcoholic beverages for 12 hours  Avoid making any critical decisions for 24 hours    DIET:  You may resume your normal diet, but some patients find that heavy or large meals may lead to indigestion or vomiting. I suggest a light meal as first food intake. MEDICATIONS:  The use of some over-the-counter pain medication may lead to bleeding after colon biopsies or polyp removal.  Tylenol (also called acetaminophen) is safe to take even if you have had colonoscopy with polyp removal.  Based on the procedure you can resume baby-dose aspirin (81 mg) and may safely take anticoagulation (like apixaban (Eliquis), dabigatran (Pradaxa), edoxaban ([place]), rivaroxaban (Xarelto) or warfarin (Coumadin)) or antiplatelet medications (high dose aspirin 325mg, Clopidogrel (Plavix), Prasugrel (Effient), Ticagrelor (Brilinta))for the next two (48 hours) days. ACTIVITY:  You may resume your normal household activities, but it is recommended that you spend the remainder of the day resting -  avoid any strenuous activity. CALL DR. LOWE'S OFFICE IF:  Increasing pain, nausea, vomiting  Abdominal distension (swelling)  Significant new or increased bleeding (oral or rectal)  Fever/Chills  Chest pain/shortness of breath                       Additional instructions:   1 polyps identified and resected on this exam.    We recommend a repeat colonoscopy in 3 years due to relatively larger size of polyp. This amount of time will be confirmed in a pathology report letter that will be mailed to you within 10 business days. Otherwise, normal examination of the colon and terminal ileum. Random biopsies obtained from the colon and terminal ileum. We will mail your final pathology result letter and further recommendations within 10 business days. If there are any clinically significant abnormal findings, we will contact you by phone. It was an honor to be your doctor today. Please let me or my office staff know if you have any feedback about today's procedure. Merary Martinez MD, September 26, 2022    Colonoscopy saves lives, and can prevent colon cancer. Everyone aged 48 or older needs colonoscopy.   Tell your family and friends: get the test!

## 2022-09-26 NOTE — PROCEDURES
[address] [facility].  [address] [facility], [address]  [phone]                              Colonoscopy Procedure Note      Indications:    Hematochezia/melena     :  Cherelle Avery MD    Staff: Endoscopy Technician-1: Jazmin Barrientos  Endoscopy RN-1: Mikala Bradley RN    Referring Provider: Tayler Beverly MD    Sedation: MAC    Procedure Details:  After informed consent was obtained with all risks and benefits of procedure explained and preoperative exam completed, the patient was taken to the endoscopy suite and placed in the left lateral decubitus position. Upon sequential sedation as per above, a digital rectal exam was performed per below. The Olympus videocolonoscope was inserted in the rectum and carefully advanced to the terminal ileum. The quality of preparation was excellent. [place] Bowel Prep Score : 3/3/2/8 . The colonoscope was slowly withdrawn with careful evaluation between folds. Retroflexion in the rectum was performed. Findings: Normal mucosa in entire colon and terminal ileum; random biopsies obtained  Rectum: normal   Sigmoid: 12mm pedunculated polyp resected completely with hot snare and retrieved   Descending Colon: normal  Transverse Colon: normal  Ascending Colon: normal  Cecum: normal   Terminal Ileum: normal    Specimen Removed:    ID Type Source Tests Collected by Time Destination   1 : t. ileum bx Preservative Ileum  Kelsey Patel MD 9/26/2022 [id number] Pathology   2 : random colon bx Preservative Colon  Kelsey Patel MD 9/26/2022 0778 Pathology   3 : sigmoid colon polyp Preservative Sigmoid  Kelsey Patel MD 9/26/2022 [id number] Pathology       Complications: None. EBL:  none    Impression:    See Postoperative diagnosis above    Recommendations:   - Await pathology evaluation of biopsy / resected tissue  - Resume normal medications. - Resume previous diet.   - Recommend repeat colonoscopy in 3 years    Discharge Disposition:  Home in the company of a  when able to ambulate.     Dorina Sprague MD  9/26/2022  9:14 AM

## 2022-09-26 NOTE — H&P
Pre-Endoscopy H&P   Chief complaint: screening or surveillance of previous colon polyps    HPI:  Patient presents for procedure. The indication for the procedure, the patient's history and the patient's current medications are reviewed prior to the procedure and that data is reported on the endoscopy note in the chart to which this document is attached. Any significant complaints with regard to organ systems will be noted, and if not mentioned then a review of systems is not contributory. Past Medical History:   Diagnosis Date    Depression     High cholesterol     Hypertension     Kidney stones     Peptic ulcer       Past Surgical History:   Procedure Laterality Date    HX CERVICAL FUSION      C5 C4     HX LAP CHOLECYSTECTOMY  2004    HX OTHER SURGICAL  2016    Incision and drainage of intersphincteric perirectal abscess; Dr. Isabel Love. IR INJ FORAMIN EPID LUMB ANES/STER SNGL  2021     Social   Social History     Tobacco Use    Smoking status: Former     Packs/day: 0.50     Years: 10.00     Pack years: 5.00     Types: Cigarettes     Quit date: 3/16/2011     Years since quittin.5    Smokeless tobacco: Never    Tobacco comments:     former cigarette smoker quit 2012   Substance Use Topics    Alcohol use: No      Family History   Problem Relation Age of Onset    Diabetes Maternal Grandmother     Stroke Maternal Grandmother     Colon Cancer Paternal Grandmother     Cancer Paternal Grandfather         prostate    Heart Disease Father     Anesth Problems Neg Hx       Allergies   Allergen Reactions    Morphine Angioedema     PATIENT STATES HEART RATE DROPPED      None       PHYSICAL EXAM:  The patient is examined immediately prior to the procedure. Visit Vitals  /81   Pulse 68   Temp 97.9 °F (36.6 °C)   Resp 12   Ht 5' 9\" (1.753 m)   Wt 97.1 kg (214 lb)   SpO2 98%   BMI 31.60 kg/m²     Gen: Appears comfortable, no distress.   Pulm: comfortable respirations with no abnormal audible breath sounds  CV: heart regular, well perfused  GI: abdomen flat. ASSESSMENT:  Patient here for procedure. The indication for the procedure, the patient's history and the patient's current medications are reviewed prior to the procedure and that data is reported on the endoscopy note in the chart to which this document is attached. PLAN:  Informed consent discussion held, patient afforded an opportunity to ask questions and all questions answered. After being advised of the risks, benefits, and alternatives, the patient requested that we proceed and indicated so on a written consent form. Will proceed with procedure as planned.   Solange Catherine MD

## 2022-09-26 NOTE — ANESTHESIA POSTPROCEDURE EVALUATION
Post-Anesthesia Evaluation and Assessment    Patient: Giovanna Valente MRN: [id number]  SSN: [id number]    YOB: 1975  Age: 56 y.o. Sex: male      I have evaluated the patient and they are stable and ready for discharge from the PACU. Cardiovascular Function/Vital Signs  Visit Vitals  BP (!) 130/93   Pulse (!) 57   Temp 36.6 °C (97.8 °F)   Resp (!) 7   Ht 5' 9\" (1.753 m)   Wt 97.1 kg (214 lb)   SpO2 98%   BMI 31.60 kg/m²       Patient is status post MAC anesthesia for Procedure(s):  COLONOSCOPY  COLON BIOPSY  ENDOSCOPIC POLYPECTOMY. Nausea/Vomiting: None    Postoperative hydration reviewed and adequate. Pain:  Pain Scale 1: Numeric (0 - 10) (09/26/22 0935)  Pain Intensity 1: 0 (09/26/22 0935)   Managed    Neurological Status: At baseline    Mental Status, Level of Consciousness: Alert and  oriented to person, place, and time    Pulmonary Status:   O2 Device: None (Room air) (09/26/22 0935)   Adequate oxygenation and airway patent    Complications related to anesthesia: None    Post-anesthesia assessment completed. No concerns    Signed By: Damián Naidu MD     September 26, 2022              Procedure(s):  COLONOSCOPY  COLON BIOPSY  ENDOSCOPIC POLYPECTOMY. MAC    <BSHSIANPOST>    INITIAL Post-op Vital signs:   Vitals Value Taken Time   /93 09/26/22 0940   Temp 36.6 °C (97.8 °F) 09/26/22 0916   Pulse 59 09/26/22 0941   Resp 10 09/26/22 0941   SpO2 97 % 09/26/22 0941   Vitals shown include unvalidated device data.

## 2022-09-26 NOTE — DISCHARGE INSTRUCTIONS
[id number] [facility] Kvng Herrmann MD  [phone]      September 26, 2022    Ericka Jeanralvarezdelpinaida  YOB: 1975    COLONOSCOPY DISCHARGE INSTRUCTIONS    If there is redness at IV site you should apply warm compress to area. If redness or soreness persist contact Dr. Delmis Herrmann or your primary care doctor. There may be a slight amount of blood passed from the rectum. Gaseous discomfort may develop, but walking, belching will help relieve this. You may not operate a vehicle for 12 hours  You may not operate machinery or dangerous appliances for rest of today  You may not drink alcoholic beverages for 12 hours  Avoid making any critical decisions for 24 hours    DIET:  You may resume your normal diet, but some patients find that heavy or large meals may lead to indigestion or vomiting. I suggest a light meal as first food intake. MEDICATIONS:  The use of some over-the-counter pain medication may lead to bleeding after colon biopsies or polyp removal.  Tylenol (also called acetaminophen) is safe to take even if you have had colonoscopy with polyp removal.  Based on the procedure you can resume baby-dose aspirin (81 mg) and may safely take anticoagulation (like apixaban (Eliquis), dabigatran (Pradaxa), edoxaban ([place]), rivaroxaban (Xarelto) or warfarin (Coumadin)) or antiplatelet medications (high dose aspirin 325mg, Clopidogrel (Plavix), Prasugrel (Effient), Ticagrelor (Brilinta))for the next two (48 hours) days. ACTIVITY:  You may resume your normal household activities, but it is recommended that you spend the remainder of the day resting -  avoid any strenuous activity. CALL DR. LOWE'S OFFICE IF:  Increasing pain, nausea, vomiting  Abdominal distension (swelling)  Significant new or increased bleeding (oral or rectal)  Fever/Chills  Chest pain/shortness of breath                       Additional instructions:   1 polyps identified and resected on this exam.    We recommend a repeat colonoscopy in 3 years due to size of the polyp. This amount of time will be confirmed in a pathology report letter that will be mailed to you within 10 business days. Normal examination of the colon and terminal ileum. Random biopsies obtained from the colon and terminal ileum. We will mail your final pathology result letter and further recommendations within 10 business days. If there are any clinically significant abnormal findings, we will contact you by phone. It was an honor to be your doctor today. Please let me or my office staff know if you have any feedback about today's procedure. Marisol Pagan MD, September 26, 2022    Colonoscopy saves lives, and can prevent colon cancer. Everyone aged 48 or older needs colonoscopy. Tell your family and friends: get the test!    Patient Education on Sedation / Analgesia Administered for Procedure      For 24 hours after general anesthesia or intravenous analgesia / sedation:  Have someone responsible help you with your care  Limit your activities  Do not drive and operate hazardous machinery  Do not make important personal, legal or business decisions  Do not drink alcoholic beverages  If you have not urinated within 8 hours after discharge, please contact your physician  Resume your medications unless otherwise instructed    For 24 hours after general anesthesia or intravenous analgesia / sedation  you may experience:  Drowsiness, dizziness, sleepiness, or confusion  Difficulty remembering or delayed reaction times  Difficulty with your balance, especially while walking, move slowly and carefully, do not make sudden position changes  Difficulty focusing or blurred vision    You may not be aware of slight changes in your behavior and/or your reaction time because of the medication used during and after your procedure.     Report the following to your physician:  Excessive pain, swelling, redness or odor of or around the surgical area  Temperature over 100.5  Nausea and vomiting lasting longer than 4 hours or if unable to take medications  Any signs of decreased circulation or nerve impairment to extremity: change in color, persistent numbness, tingling, coldness or increase pain  Any questions or concerns    IF YOU REPORT TO AN EMERGENCY ROOM, DOCTOR'S OFFICE OR HOSPITAL WITHIN 24 HOURS AFTER YOUR PROCEDURE, BRING THIS SHEET AND YOUR AFTER VISIT SUMMARY WITH YOU AND GIVE IT TO THE PHYSICIAN OR NURSE ATTENDING YOU. Colon Polyps: Care Instructions  Your Care Instructions     Colon polyps are growths in the colon or the rectum. The cause of most colon polyps is not known, and most people who get them do not have any problems. But a certain kind can turn into cancer. For this reason, regular testing for colon polyps is important for people as they get older. It is also important for anyone who has an increased risk for colon cancer. Polyps are usually found through routine colon cancer screening tests. Although most colon polyps are not cancerous, they are usually removed and then tested for cancer. Screening for colon cancer saves lives because the cancer can usually be cured if it is caught early. If you have a polyp that is the type that can turn into cancer, you may need more tests to examine your entire colon. The doctor will remove any other polyps that he or she finds, and you will be tested more often. Follow-up care is a key part of your treatment and safety. Be sure to make and go to all appointments, and call your doctor if you are having problems. It's also a good idea to know your test results and keep a list of the medicines you take. How can you care for yourself at home? Regular exams to look for colon polyps are the best way to prevent polyps from turning into colon cancer.  These can include stool tests, sigmoidoscopy, colonoscopy, and CT colonography. Talk with your doctor about a testing schedule that is right for you. To prevent polyps  There is no home treatment that can prevent colon polyps. But these steps may help lower your risk for cancer. Stay active. Being active can help you get to and stay at a healthy weight. Try to exercise on most days of the week. Walking is a good choice. Eat well. Choose a variety of vegetables, fruits, legumes (such as peas and beans), fish, poultry, and whole grains. Do not smoke. If you need help quitting, talk to your doctor about stop-smoking programs and medicines. These can increase your chances of quitting for good. If you drink alcohol, limit how much you drink. Limit alcohol to 2 drinks a day for men and 1 drink a day for women. When should you call for help? Call your doctor now or seek immediate medical care if:    You have severe belly pain. Your stools are maroon or very bloody. Watch closely for changes in your health, and be sure to contact your doctor if:    You have a fever. You have nausea or vomiting. You have a change in bowel habits (new constipation or diarrhea). Your symptoms get worse or are not improving as expected. Where can you learn more? Go to http://www.perera.com/  Enter C571 in the search box to learn more about \"Colon Polyps: Care Instructions. \"  Current as of: September 8, 2021               Content Version: 13.2  © 4252-8173 [employer], [employer]orporated. Care instructions adapted under license by [employer] (which disclaims liability or warranty for this information). If you have questions about a medical condition or this instruction, always ask your healthcare professional. [place] [address] any warranty or liability for your use of this information.

## 2022-09-26 NOTE — PERIOP NOTES
Initial RN admission and assessment performed and documented in Endoscopy navigator. Patient evaluated by anesthesia in pre-procedure holding. All procedural vital signs, airway assessment, and level of consciousness information monitored and recorded by anesthesia staff on the anesthesia record. Specimens x3  labeled and reviewed with laura. Report received from CRNA post procedure. Patient transported to recovery area by RN. Endoscope was pre-cleaned at bedside immediately following procedure by Lizzy Santos.

## 2022-09-26 NOTE — ANESTHESIA PREPROCEDURE EVALUATION
Anesthetic History   No history of anesthetic complications            Review of Systems / Medical History  Patient summary reviewed, nursing notes reviewed and pertinent labs reviewed    Pulmonary  Within defined limits                 Neuro/Psych   Within defined limits           Cardiovascular    Hypertension                   GI/Hepatic/Renal           PUD     Endo/Other  Within defined limits           Other Findings              Physical Exam    Airway  Mallampati: I  TM Distance: > 6 cm  Neck ROM: normal range of motion   Mouth opening: Normal     Cardiovascular  Regular rate and rhythm,  S1 and S2 normal,  no murmur, click, rub, or gallop             Dental  No notable dental hx       Pulmonary  Breath sounds clear to auscultation               Abdominal  GI exam deferred       Other Findings            Anesthetic Plan    ASA: 2  Anesthesia type: MAC          Induction: Intravenous  Anesthetic plan and risks discussed with: Patient

## 2022-09-26 NOTE — ROUTINE PROCESS
Emma Blanchard  1975  [id number]    Situation:  Verbal report received from: Danii Dio  Procedure: Procedure(s):  COLONOSCOPY  COLON BIOPSY  ENDOSCOPIC POLYPECTOMY    Background:    Preoperative diagnosis: Diarrhea  Blood in Stool  Postoperative diagnosis: Colon Polyp    :  Dr. David Nelson  Assistant(s): Endoscopy Technician-1: Josh Bashir  Endoscopy RN-1: Geno Walter RN    Specimens:   ID Type Source Tests Collected by Time Destination   1 : t. ileum bx Preservative Ileum  Doreen Mckeon MD 9/26/2022 [id number] Pathology   2 : random colon bx Preservative Colon  Doreen Mckeon MD 9/26/2022 [id number] Pathology   3 : sigmoid colon polyp Preservative Sigmoid  Doreen Mckeon MD 9/26/2022 [id number] Pathology     H. Pylori  no    Assessment:  Intra-procedure medications     Anesthesia gave intra-procedure sedation and medications, see anesthesia flow sheet yes    Intravenous fluids: NS@ KVO     Vital signs stable     Abdominal assessment: round and soft     Recommendation:  Discharge patient per MD order.   Family  Permission to share finding with family or friend yes

## 2023-09-21 ENCOUNTER — TRANSCRIBE ORDERS (OUTPATIENT)
Facility: HOSPITAL | Age: 48
End: 2023-09-21

## 2023-09-21 DIAGNOSIS — M54.50 LUMBAR PAIN: Primary | ICD-10-CM

## 2023-09-21 DIAGNOSIS — M54.16 LUMBAR RADICULOPATHY: ICD-10-CM

## 2023-10-02 ENCOUNTER — HOSPITAL ENCOUNTER (OUTPATIENT)
Age: 48
Discharge: HOME | End: 2023-10-05
Payer: OTHER GOVERNMENT

## 2023-10-02 DIAGNOSIS — M54.16 LUMBAR RADICULOPATHY: ICD-10-CM

## 2023-10-02 DIAGNOSIS — M54.50 LUMBAR PAIN: ICD-10-CM

## 2023-10-02 PROCEDURE — 72148 MRI LUMBAR SPINE W/O DYE: CPT

## (undated) DEVICE — TRAP SURG QUAD PARABOLA SLOT DSGN SFTY SCRN TRAPEASE

## (undated) DEVICE — TUBING HYDR IRR --

## (undated) DEVICE — FORCEPS BX L240CM JAW DIA2.8MM L CAP W/ NDL MIC MESH TOOTH

## (undated) DEVICE — REM POLYHESIVE ADULT PATIENT RETURN ELECTRODE: Brand: VALLEYLAB

## (undated) DEVICE — SNARE ENDOSCP M L240CM W27MM SHTH DIA2.4MM CHN 2.8MM OVL